# Patient Record
Sex: MALE | Race: WHITE
[De-identification: names, ages, dates, MRNs, and addresses within clinical notes are randomized per-mention and may not be internally consistent; named-entity substitution may affect disease eponyms.]

---

## 2018-08-21 NOTE — HP
HISTORY AND PHYSICAL:

 

DATE OF SURGERY:  08/23/18

 

SURGEON:  Klarissa Barrera MD * (DICTATED BY SUZETTE FLOYD)

 

PROCEDURE:  Right total hip arthroplasty.

 

CHIEF COMPLAINT:  Right hip pain.

 

HISTORY OF PRESENT ILLNESS:  Mr. Mack is a 62-year-old gentleman with 
continued complaints of right hip pain.  He has failed conservative treatment 
and elected to proceed with a right total hip arthroplasty, which is scheduled 
for 

08/23/18 with Dr. Barrera.

 

PAST MEDICAL HISTORY:  Hypertension, history of a DVT, and sleep apnea.

 

PAST SURGICAL HISTORY:  Appendectomy, left total hip arthroplasty.

 

CURRENT MEDICATIONS:

1.  Furosemide 40 mg one and half tab daily.

2.  Aleve.

3.  Lipo-flavonoid.

4.  Benicar 40/25 mg daily.

5.  Centrum Silver.

 

ALLERGIES:  None.

 

FAMILY HISTORY:  Lung cancer, heart disease, and kidney disease.

 

SOCIAL HISTORY:  He is a 62-year-old gentleman.  He lives with his wife.  He 
does not smoke or use drugs.  Uses occasional alcohol.

 

REVIEW OF SYSTEMS:  A complete 14-point review of systems was reviewed with the 
patient.  It was positive for history of a DVT approximately 5 to 6 years ago.  
He denies a history of hepatitis, HIV, MRSA, or anesthesia problems.

 

                               PHYSICAL EXAMINATION

 

GENERAL:  He is well developed, well nourished, in no acute distress.

 

VITAL SIGNS:  He stands 71 inches tall, weighs 318 pounds.  His blood pressure 
is 130/80, his heart rate is 82.

 

HEENT:  Normocephalic, atraumatic.

 

NECK:  Supple.  No palpable lymph nodes.

 

PULMONARY:  The lungs are clear to auscultation bilaterally.

 

CARDIO:  Regular rate and rhythm.  Strong S1, S2.

 

ABDOMEN:  Soft, nontender, nondistended.

 

MUSCULOSKELETAL:  Right lower extremity, the skin is intact.  There are no open 
wounds or abrasions.  He walks with an antalgic type gait favoring his right 
hip. He has decreased internal and external rotation of the right hip.  He has 
a 2+ dorsalis pedis pulse, 1+ pitting edema.  The lower extremity muscle group 
strengths are intact at 5/5 and he has intact sensation.  The calves are soft 
and nontender.

 

NEUROLOGICAL:  He is alert, oriented x3.  Cranial nerves II through XII are 
intact.

 

 ASSESSMENT AND PLAN:  Mr. Mack is a 62-year-old gentleman with end-stage 
osteoarthritis of the right hip.  He has failed conservative treatment and 
elected to proceed with a right total hip arthroplasty, which is scheduled for 
08/23/18 with Dr. Barrera.  Dr. Barrera discussed the risks and benefits of the 
surgery at today's visit and all of his questions were answered.  He will 
follow up with Dr. Barrera 2 weeks after the surgery.

 

 ____________________________________ SUZETTE FLOYD

 

[id number]/[facility] #: [id number]

MTDD

## 2018-08-23 ENCOUNTER — HOSPITAL ENCOUNTER (INPATIENT)
Dept: HOSPITAL 25 - AA | Age: 63
LOS: 2 days | Discharge: HOME | DRG: 301 | End: 2018-08-25
Attending: HOSPITALIST | Admitting: ORTHOPAEDIC SURGERY
Payer: COMMERCIAL

## 2018-08-23 DIAGNOSIS — Z79.1: ICD-10-CM

## 2018-08-23 DIAGNOSIS — I10: ICD-10-CM

## 2018-08-23 DIAGNOSIS — E66.01: ICD-10-CM

## 2018-08-23 DIAGNOSIS — Z96.642: ICD-10-CM

## 2018-08-23 DIAGNOSIS — Z82.49: ICD-10-CM

## 2018-08-23 DIAGNOSIS — G47.33: ICD-10-CM

## 2018-08-23 DIAGNOSIS — Z84.1: ICD-10-CM

## 2018-08-23 DIAGNOSIS — Z80.1: ICD-10-CM

## 2018-08-23 DIAGNOSIS — Z86.718: ICD-10-CM

## 2018-08-23 DIAGNOSIS — M16.11: Primary | ICD-10-CM

## 2018-08-23 DIAGNOSIS — Z79.899: ICD-10-CM

## 2018-08-23 PROCEDURE — 36415 COLL VENOUS BLD VENIPUNCTURE: CPT

## 2018-08-23 PROCEDURE — 85610 PROTHROMBIN TIME: CPT

## 2018-08-23 PROCEDURE — 85018 HEMOGLOBIN: CPT

## 2018-08-23 PROCEDURE — 85049 AUTOMATED PLATELET COUNT: CPT

## 2018-08-23 PROCEDURE — 94660 CPAP INITIATION&MGMT: CPT

## 2018-08-23 PROCEDURE — 0SR904A REPLACEMENT OF RIGHT HIP JOINT WITH CERAMIC ON POLYETHYLENE SYNTHETIC SUBSTITUTE, UNCEMENTED, OPEN APPROACH: ICD-10-PCS | Performed by: ORTHOPAEDIC SURGERY

## 2018-08-23 PROCEDURE — 80048 BASIC METABOLIC PNL TOTAL CA: CPT

## 2018-08-23 PROCEDURE — 85014 HEMATOCRIT: CPT

## 2018-08-23 RX ADMIN — ACETAMINOPHEN SCH MG: 325 TABLET ORAL at 15:30

## 2018-08-23 RX ADMIN — FENTANYL CITRATE PRN MCG: 0.05 INJECTION, SOLUTION INTRAMUSCULAR; INTRAVENOUS at 11:04

## 2018-08-23 RX ADMIN — OXYCODONE HYDROCHLORIDE AND ACETAMINOPHEN PRN TAB: 5; 325 TABLET ORAL at 11:41

## 2018-08-23 RX ADMIN — FENTANYL CITRATE PRN MCG: 0.05 INJECTION, SOLUTION INTRAMUSCULAR; INTRAVENOUS at 11:42

## 2018-08-23 RX ADMIN — ACETAMINOPHEN SCH MG: 325 TABLET ORAL at 22:57

## 2018-08-23 RX ADMIN — DOCUSATE SODIUM SCH MG: 100 CAPSULE, LIQUID FILLED ORAL at 22:57

## 2018-08-23 RX ADMIN — OXYCODONE HYDROCHLORIDE AND ACETAMINOPHEN PRN TAB: 5; 325 TABLET ORAL at 11:40

## 2018-08-23 RX ADMIN — CYCLOBENZAPRINE HYDROCHLORIDE PRN MG: 10 TABLET, FILM COATED ORAL at 15:29

## 2018-08-23 RX ADMIN — MAGNESIUM HYDROXIDE SCH ML: 400 SUSPENSION ORAL at 22:58

## 2018-08-23 RX ADMIN — OXYCODONE HYDROCHLORIDE AND ACETAMINOPHEN PRN TAB: 5; 325 TABLET ORAL at 16:34

## 2018-08-23 RX ADMIN — CEFAZOLIN SCH MLS/HR: 1 INJECTION, POWDER, FOR SOLUTION INTRAVENOUS at 16:34

## 2018-08-23 RX ADMIN — OXYCODONE HYDROCHLORIDE PRN MG: 5 CAPSULE ORAL at 14:00

## 2018-08-23 NOTE — RAD
HISTORY: RIGHT TOTAL HIP REPLACEMENT



COMPARISONS: None



VIEWS: 1 , single frontal portable intraoperative view of the right hip arthroplasty 



FINDINGS:



Single portable intraoperative view of the right hip during arthroplasty demonstrates a

right hip arthroplasty with a temporary femoral sizing component



IMPRESSION: 

LIMITED PORTABLE INTRAOPERATIVE VIEW OF THE RIGHT HIP DURING ARTHROPLASTY

## 2018-08-23 NOTE — PN
Progress Note





- Progress Note


Date of Service: 08/23/18


Note: 





resting comfortably, pain well controlled; 2+ DP pulse, intact sensation, able 

to dorsi flex/plantar flex; dressing c/d/i

## 2018-08-23 NOTE — CONS
CC:  Dr. Barrera; Liana Dutton DO *

 

CONSULTATION REPORT:

 

DATE OF CONSULT:  18

 

PATIENT OF:  Dr. Klarissa Barrera.

 

REFERRED TO:  Aisha Almanza DO

 

PRIMARY CARE PHYSICIAN:  Liana Dutton DO

 

REASON FOR CONSULT:  Medical co-management.

 

HISTORY OF PRESENT ILLNESS:  Mr. Mack is a 62-year-old gentleman with past 
medical history significant for hypertension, osteoarthritis, obstructive sleep 
apnea and morbid obesity who has been suffering from consistent right hip pain 
for the past few years.  The patient had prior left hip replacement done back 
in  and for the past few months, he had noticed increasing pain to his 
right hip.  He was evaluated by the [facility] and found 
to be a good candidate for right hip replacement to be performed on an elective 
basis.  The patient had history of hypertension that has been well controlled 
using his medications at home.  He has a significant family history of coronary 
artery disease; however, he denies any history of hyperlipidemia or history of 
MI himself. He also has history of obstructive sleep apnea, which he has not 
been using any CPAP at home.  According to his wife, she wakes him up numerous 
times during the night when she notices that he stopped breathing and has been 
snoring loudly.  He was taken to the operating room earlier today and had a 
successful right total hip replacement and he was lying comfortably in recovery 
room.  We were asked to see the patient in consultation regarding his medical 
comorbidities and to provide medical co-management while his stay in the 
hospital.

 

PAST MEDICAL HISTORY:  As mentioned above significant for:

1.  Hypertension.

2.  Osteoarthritis.

3.  Obstructive sleep apnea.

4.  Morbid obesity.

 

PAST SURGICAL HISTORY:

1.  Significant for appendectomy.

2.  Prior total hip replacement on the left side back in .

3.  Postop day #0, status post right total hip replacement.

 

CURRENT MEDICATIONS:  His medications at home include:

1.  Acetaminophen 650 mg p.o. q.4 hours as needed for pain or fever.

2.  Lasix 40 mg p.o. daily.

3.  Benicar 40/25 mg 1 tablet p.o. q.a.m.

4.  Multiple vitamins 1 tablet daily.

5.  Aleve 220 mg 2 tablets by mouth q.6 hours as needed for arthritic aches and 
pains.

 

ALLERGIES:  He has no known drug allergies.

 

FAMILY HISTORY:  Reviewed and significant for coronary artery disease and his 
mother, who  in her 70s as well as history of lung cancer in his father in 
his early 40s.

 

SOCIAL HISTORY:  The patient is retired.  He is nonsmoker who drinks alcohol 
rarely.  His wife, Donna, is the healthcare proxy carrier and he wishes to be a 
full code.

 

REVIEW OF SYSTEMS:  See HPI, otherwise 14 points review of systems were 
examined and they were essentially negative.

 

PHYSICAL EXAM:  General:  He is a pleasant, obese, upper middle age gentleman, 
lying comfortably on his bed and in no acute distress or discomfort at the time 
of consultation.  Vitals revealed a blood pressure of 159/83, pulse of 87, 
temperature of 96.8, respirations of 14 with O2 sat of 98% on 5 L oxygen via 
mask.  HEENT: Head is normocephalic, atraumatic.  Sclerae anicteric.  PERRLA.  
EOMs intact. Oropharynx is pink and moist.  Neck:  Supple.  Trachea midline.  
No cervical adenopathy, thyromegaly, or JVD.  Lungs:  Clear to auscultation 
bilaterally. Heart:  Regular rate and rhythm.  Normal S1 and S2 without rubs, 
murmurs, or gallops.  Back with normal curvature.  No CVA tenderness.  Abdomen:
  Soft, obese, and round.  There is no distention or tenderness noted.  No 
guarding, rigidity, or rebound tenderness.  No hernias, masses, or 
hepatosplenomegaly.  Extremities: Without cyanosis, clubbing, or edema.  
Neurologic:  He is awake, alert, and oriented x4.  Tongue is midline.  Muscle 
 is equal bilaterally and sensation is intact throughout.  Rectal exam 
deferred at this time.

 

IMPRESSION:  A 62-year-old gentleman with past medical history of hypertension, 
morbid obesity, osteoarthritis, and obstructive sleep apnea, who is postop day #
0 status post right total hip replacement. ASSESSMENT AND PLAN:

1.  Hypertension.  The patient appears to be slightly hypertensive in the 
immediate postoperative period, likely due to added stress from surgery and 
occasional pain and discomfort.  I will maintain him on his Benicar to be taken 
once daily in the morning; however, we will hold his Lasix for the time being 
since he has been getting IV fluid in a postoperative period.  We will keep 
close monitoring of his blood pressure and potentially may add other agents to 
keep it within normal limits.

2.  Status post right total hip replacement, management per orthopedic team.  I 
anticipate he is going to start his physical therapy in the morning.  He is 
currently having a Carrillo catheter that will be likely discontinued in the 
morning as well.  Pain control appears to be adequate at this time and 
management will be per ortho team.  Anticoagulation protocol will be also 
managed by Orthopedics.

3.  Obstructive sleep apnea.  The patient had an episode of desaturation of his 
oxygen level at PACU.  He refuses to use CPAP at home.  I had long conversation 
with him regarding the necessity to keep monitor his oxygen saturation in the 
next 24 hours and he will use his CPAP while in the hospital and hopefully to 
continue using at home.

4.  Osteoarthritis.  We will continue his pain medication per orthopedic team.

5.  Morbid obesity.  Supportive care and physical therapy.

6.  DVT prophylaxis.  The patient is being covered with Lovenox to be bridge to 
Coumadin and management is done by orthopedic team.

7.  Code status.  He is a full code.

 

TIME SPENT:  I spent approximately 50 minutes consult into this patient, for 
which greater than 50% of the time spent on taking history and performing 
physical exam. I went on and discussed the case with my attending, Dr. Aisha Almanza, who agreed to plan of care.

 

Thank you for this consultation.

 

____________________________________ SUZETTE SHAHID

 

[id number]/[facility] #: [id number]

MTDD

## 2018-08-23 NOTE — XMS REPORT
Daryl Mack

 Created on:2018



Patient:Daryl Mack

Sex:Male

:1955

External Reference #:[id number]





Demographics







 Address  [address] [place] [address]

 

 Home Phone  [phone]

 

 Mobile Phone  [phone]

 

 Email Address  [email or web address]

 

 Preferred Language  English

 

 Marital Status  Declined to Specify/Unknown

 

 Buddhism Affiliation  Unknown

 

 Race  White

 

 Ethnic Group  Declined to Specify/Unknown









Author







 Organization  [employer]

 

 Address  [address]



   [place] [address]

 

 Phone  [phone]









Support







 Name  Relationship  Address  Phone

 

 Donna Mack  Wife  [address]  [phone]



     [place] [address]  









Care Team Providers







 Name  Role  Phone

 

 Liana Newman DO  Primary Care Physician  Unavailable









Payers







 Type  Date  Identification Numbers  Payment Provider  Subscriber

 

 Commercial    Policy Number: [id number]  [facility]  Daryl Mack









 PayID: [id number]  [address]









 [place] [address]







Problems







 Date  Description  Provider  Status

 

 Onset: 2018  Localized, primary osteoarthritis of the  Klarissaann marie Barrera M.D.  Active



   pelvic region and thigh    







Family History







 Date  Family Member(s)  Problem(s)  Comments

 

   General  Cancer  

 

   General  Lung Cancer  

 

   Father  Lung Cancer  

 

   Mother  Heart Disease  

 

   First Brother  Lung Cancer  

 

   First Brother  Brain Cancer  







Social History







 Type  Date  Description  Comments

 

 Marital Status      

 

 Lives With    Spouse  

 

 Occupation    Currently Working   at [employer]



       store.

 

 ETOH Use    Occasionally consumes alcohol  

 

 Smoking    Patient has never smoked  

 

 Recreational Drug Use    Denies Drug Use  

 

 Exercise Type/Frequency    Exercises sporadically  







Allergies, Adverse Reactions, Alerts







 Date  Description  Reaction  Status  Severity  Comments

 

 2018  NKDA    active    







Medications







 Medication  Date  Status  Form  Strength  Qnty  SIG  Indications  Ordering



                 Provider

 

 Raised  /  Active  Misc    1units  use after  M16.11  Klarissa



 Toilet Seat  2018          right total    Bruce,



             hip    M.DWil



             arthroplasty    

 

 Rolling  /  Active      1units  use for  M16.11  Klarissa



 Walker  2018          ambulation s/p    kamaljit BarreraDWil

 

 Lasix  /  Active  Tablets  40mg    1.5 po qday    Unknown



   0000              

 

 Centrum  00/00/  Active  Tablets      1 by mouth    Unknown



 Silver  0000          every day    

 

 Olmesartan  /  Active  Tablets  40-25mg    1 by mouth    Unknown



 Medoxomil/Hy  0000          every day    



 drochlorothi                



 azide                

 

 Equate Plus  /  Active  Liquid      as needed    Unknown



   0000              

 

 Tylenol  00/  Active            Unknown



   0000              

 

                 

 

 Aleve  /  Hx  Capsules  220mg    1-2 by mouth    Unknown



   0000 -          twice a day as    



   2018              

 

 Benicar HCT  /  Hx  Tablets  40-25mg    1 by mouth    Unknown



   0000 -          every day    



   2018              

 

 Lipo-Flavono  /  Hx  Tablets      1 po tid    Unknown



 id Plus  0000 -              



   2018              

 

 Ocuflox  /  Hx  Solution  0.3%    2 drops each    Unknown



   0000 -          eye every two    



   07/10/          hours while    



   2018          awake x5 days.    

 

 Aleve  /  Hx            Unknown



   0000 -              



   2018              







Vital Signs







 Date  Vital  Result  Comment

 

 2018  Height  71 inches  5'11"









 Weight  318.75 lb  

 

 BP Systolic Sitting  130 mmHg  

 

 BP Diastolic Sitting  86 mmHg  

 

 Respiratory Rate  20 /min  

 

 Body Temperature  98.1 F  

 

 Pain Level  7  

 

 BMI (Body Mass Index)  44.5 kg/m2  









 2018  Height  69.5 inches  5'9.50"









 Weight  319.00 lb  

 

 Heart Rate  80 /min  

 

 BP Systolic  126 mmHg  

 

 BP Diastolic  82 mmHg  

 

 BMI (Body Mass Index)  46.4 kg/m2  









 2018  Height  73 inches  6'1"









 Heart Rate  83 /min  

 

 BP Systolic Sitting  136 mmHg  

 

 BP Diastolic Sitting  70 mmHg  

 

 Respiratory Rate  17 /min  

 

 Pain Level  5  can get very severe

 

 O2 % BldC Oximetry  96 %  ra







Results







 Description

 

 No Information







Procedures







 Date  CPT Code  Description  Status

 

 2018  38952  Radiologic Exam Hips Bilateral With Pelvis Minimum 5  
Completed



     Views  







Encounters







 Type  Date  Location  Provider  CPT E/M  Dx

 

 Office Visit  2018  [facility]  Klarissa Barrera M.D.  16912  
M16.11



   10:45a  [facility]      









 M25.551









 Office Visit  2018 11:00a  [facility]  Bruce Martin MD  
26675  M16.11



     [facility] [place]      









 Z96.642







Plan of Care

Future Appointment(s):2018  9:30 am - Klarissa Barrera M.D. at [facility]  9:30 am - Blue Nickerson PA-C at [facility]2018  9:30 am - SUZETTE Lubin at [facility]2018  9:30 am - Kalrissa Barrera M.D. at [facility]2018 - Klarissa Barrera M.D.M16.11 Unilateral primary 
osteoarthritis, right hipNew Medication:Raised Toilet SeatRolling WalkerFollow 
up:Follow up:   2 weeks after [id number] Pain in right hip

## 2018-08-24 LAB
HCT VFR BLD AUTO: 33 % (ref 42–52)
HGB BLD-MCNC: 11.5 G/DL (ref 14–18)
INR PPP/BLD: 1.12 (ref 0.77–1.02)
PLATELET # BLD AUTO: 155 10^3/UL (ref 150–450)

## 2018-08-24 RX ADMIN — OXYCODONE HYDROCHLORIDE PRN MG: 5 CAPSULE ORAL at 14:52

## 2018-08-24 RX ADMIN — MAGNESIUM HYDROXIDE SCH ML: 400 SUSPENSION ORAL at 20:58

## 2018-08-24 RX ADMIN — HYDROCHLOROTHIAZIDE SCH MG: 25 TABLET ORAL at 07:55

## 2018-08-24 RX ADMIN — ACETAMINOPHEN SCH MG: 325 TABLET ORAL at 14:52

## 2018-08-24 RX ADMIN — VALSARTAN SCH MG: 160 TABLET ORAL at 07:55

## 2018-08-24 RX ADMIN — OXYCODONE HYDROCHLORIDE PRN MG: 5 CAPSULE ORAL at 22:07

## 2018-08-24 RX ADMIN — OXYCODONE HYDROCHLORIDE PRN MG: 5 CAPSULE ORAL at 05:48

## 2018-08-24 RX ADMIN — CYCLOBENZAPRINE HYDROCHLORIDE PRN MG: 10 TABLET, FILM COATED ORAL at 01:40

## 2018-08-24 RX ADMIN — FUROSEMIDE SCH MG: 20 TABLET ORAL at 11:48

## 2018-08-24 RX ADMIN — THERA TABS SCH TAB: TAB at 07:38

## 2018-08-24 RX ADMIN — CEFAZOLIN SCH MLS/HR: 1 INJECTION, POWDER, FOR SOLUTION INTRAVENOUS at 07:38

## 2018-08-24 RX ADMIN — CEFAZOLIN SCH MLS/HR: 1 INJECTION, POWDER, FOR SOLUTION INTRAVENOUS at 00:12

## 2018-08-24 RX ADMIN — OXYCODONE HYDROCHLORIDE AND ACETAMINOPHEN PRN TAB: 5; 325 TABLET ORAL at 07:58

## 2018-08-24 RX ADMIN — DOCUSATE SODIUM SCH MG: 100 CAPSULE, LIQUID FILLED ORAL at 07:38

## 2018-08-24 RX ADMIN — MAGNESIUM HYDROXIDE SCH ML: 400 SUSPENSION ORAL at 07:39

## 2018-08-24 RX ADMIN — DOCUSATE SODIUM SCH MG: 100 CAPSULE, LIQUID FILLED ORAL at 20:58

## 2018-08-24 RX ADMIN — OXYCODONE HYDROCHLORIDE PRN MG: 5 CAPSULE ORAL at 01:41

## 2018-08-24 RX ADMIN — ACETAMINOPHEN SCH MG: 325 TABLET ORAL at 22:07

## 2018-08-24 RX ADMIN — OXYCODONE HYDROCHLORIDE AND ACETAMINOPHEN PRN TAB: 5; 325 TABLET ORAL at 11:51

## 2018-08-24 RX ADMIN — ACETAMINOPHEN SCH MG: 325 TABLET ORAL at 05:49

## 2018-08-24 NOTE — OP
DATE OF OPERATION:  08/23/18 - ROOM #341

 

DATE OF BIRTH:  [birth year]

 

SURGEON:  Klarissa Barrera MD

 

ASSISTANT:  SUZETTE Denis.  Mr. Nickerson did help throughout the procedure 
with preparation of the leg, wound retraction, manipulation of the hip and 
wound closure.

 

ANESTHESIOLOGIST:  Dr. Carrasco.

 

ANESTHESIA:  General.

 

PRE-OP DIAGNOSES:

1.  Severe end-stage degenerative osteoarthritis of the right hip joint.

2.  Morbid obesity.

 

POST-OP DIAGNOSES:

1.  Severe end-stage degenerative osteoarthritis of the right hip joint.

2.  Morbid obesity.

 

OPERATIVE PROCEDURE:  Right total hip arthroplasty plus modifier for increased 
complexity of case secondary to morbid obesity add in at least 1 hour to the 
operative time.

 

BRIEF HISTORY/INDICATIONS:  Mr. Mack is a 62-year-old gentleman with years 
of increasingly severe right hip pain.  He failed conservative treatment with 
anti- inflammatories, pain medication, intraarticular injections, and physical 
therapy. Due to continued pain and decreased quality of life, the patient 
elected to undergo a right total hip arthroplasty.  Radiograph showed bone-on-
bone arthritis. Informed consent was obtained from the patient.  He understood 
the risks of surgery included, but were not limited to bleeding, infection, 
damage to nearby structures, continued pain, need for further surgery, 
intraoperative fracture, nerve palsy, hardware failure or loosening, dislocation
, leg length discrepancy, stroke, heart attack, blood clot, and death.  He 
wished to proceed.

 

INTRAOPERATIVE FINDINGS:  Intraoperatively, the patient was noted to have 
morbid obesity, which made each step of the case more difficult.  His morbid 
obesity added at least 1 hour to the operative case.  He had at least 8 cm of 
subcutaneous fat. He was noted to have extensive osteoarthritis with full 
thickness loss of cartilage along the acetabulum more than femoral head and 
neck.

 

COMPLICATIONS:  None.

 

SPECIMEN:  Femoral head and acetabular reaming sent to Pathology.

 

ESTIMATED BLOOD LOSS:  400 cc.

 

HARDWARE USED:  This is uncemented Jackie total hip arthroplasty hardware.  
For the cup, a Tritanium cluster hole shell 56D single 20 mm screw.  For the 
liner, a Trident X3 10-degree polyethylene insert, 36 E.  For the stem, an 
Accolade TMZF size 3.5 with a 127-degree neck and for the head, a Biolox delta 
ceramic V40 femoral head 36 -2.5.

 

DESCRIPTION OF PROCEDURE:  Mr. Frederick was identified in the preanesthesia 
unit. His right lower extremity was marked as the correct operative side.  
Informed consent was signed and placed in the chart.  The patient was taken to 
the operating room and placed under general anesthesia.  A Carrillo catheter was 
placed.  The patient was placed in the left lateral decubitus position on the 
pegboard.  All bony prominences were well padded.  Right lower extremity was 
prepped and draped in the usual sterile fashion.  Preop time-out was made to 
correctly identify the patient's side and site.  Appropriate perioperative 
antibiotics were given within 1 hour of incision.

 

A standard posterior hip incision was made with the 10 blade and carried down 
to the lateral fascial layer.  New 10 blade was used to make a lateral fascial 
layer incision in line with the skin incision.  A Charnley retractor was 
placed.  The posterior hip joint was visualized.  The piriformis and conjoint 
tendons were elevated off the posterolateral femur using electrocautery and 
tagged with #5 Ethibond.  Electrocautery was then used to make a standard 
posterolateral capsular flap and tagged with #5 Ethibond. The hip was carefully 
dislocated. Lesser troch to center of the femoral head measured 62 mm.  The 
oscillating saw was used to make the appropriate femoral neck cut.  The femoral 
head was removed.  The femur was retracted anteriorly.  After appropriate 
placement of retractors, the acetabulum was visualized.

 

A long-handled knife was used to sharply remove any remaining labrum from the 
acetabular rim.  The acetabulum was sequentially reamed up to a size 55.  A 55 
reamer obtained bleeding subchondral bone bed.  A 55 trial had good fit.  Final 
implant chosen was a Tritanium cluster hole shell 56 D.  This was impacted into 
the acetabulum without difficulty.  The cup was stable with appropriate 
anteversion and abduction angle.  A 20 mm screw was placed in the supero-
posterior quadrant for added stability.  A Trident X3 10 degree polyethylene 
insert 36 E was chosen.  This was impacted into the acetabulum.  Stability of 
the liner was checked and rechecked and noted to be stable.

 

Next, attention was turned to preparation of the proximal femur.  The patient's 
body habitus made retraction quite difficult.  A canal finder was used to enter 
the proximal femur.  The proximal femur was sequentially broached up to a size 
3.5.  A 3.5 broach had excellent fit and appropriate anteversion.  A 127 neck 
trial was chosen as well as a 36+0 head trial.  Lesser troch to center of the 
femoral head measured 66 mm; therefore, a -3.5 head was chosen.  This measured 
63 mm.  The hip was reduced and taken through a range of motion. The hip was 
stable in all positions.  There was good soft tissue tension and appropriate 
leg length.

 

The hip was carefully dislocated.  All trials were removed.  Final implant 
chosen was an Accolade TMZF size 3.5 with a 127-degree neck.  Head chosen was a 
36 -2.5 Biolox delta ceramic V40 femoral head.  This was impacted onto the 
femoral neck. The hip was reduced and taken through a range of motion.  The hip 
was stable in all positions.  The incision was copiously irrigated with sterile 
saline.  Previously tagged capsule and tendons were reapproximated to the 
posterolateral femur through 2 trochanteric drill holes.  The lateral fascial 
layer was closed using interrupted #1 Vicryls.  The rest of the incision was 
closed in a layered fashion using 0 and 2- 0 Vicryls.  Skin was closed using 
running 3-0 Monocryl with Dermabond.  Sterile Adaptic, 4x4s, and paper tape 
were used to cover the incision.  The patient's anesthesia was reversed without 
difficulty.  He was taken to the PACU in stable condition.  Intended 
weightbearing will be weightbearing as tolerated.  Intended DVT prophylaxis 
will be Coumadin with a Lovenox bridge.

 

 [id number]/[facility] #: [id number]

JAMIE

## 2018-08-24 NOTE — PN
Progress Note





- Progress Note


Date of Service: 08/24/18


SOAP: 


Subjective:


[Pt was seen today sitting up in chair. He states that he has mild pain and 

that it is manageable. He states he feels as he may be ready to go home today 

but is leaning more towards going home tomorrow morning as he would like his 

wife to not drive in the evening. He denies any SOB, Chest pain, nausea or 

vomiting. ]








Objective:


[General: A&Ox3, NAD. 


MSK, RLE: Dressing is c/d/i. +df/pf. Calves are soft and non tender to 

palpation. Sensation is intact to light touch and the pt has a 2+ DP and PT 

pulse.]


 Vital Signs











Temp  97.2 F   08/24/18 09:23


 


Pulse  98   08/24/18 09:23


 


Resp  15   08/24/18 09:53


 


BP  150/65   08/24/18 09:23


 


Pulse Ox  97   08/24/18 09:23








 Intake & Output











 08/23/18 08/24/18 08/24/18





 18:59 06:59 18:59


 


Intake Total 2750 360 650


 


Output Total 1200 720 0


 


Balance 1550 -360 650


 


Intake:   


 


  IV Fluids 2600  


 


    CEFAZOLIN 3G 100  


 


    lr 2500  


 


  Oral 150 360 650


 


Output:   


 


  Urine   0


 


  Carrillo 600 720 


 


  Emesis 300  


 


  Estimated Blood Loss 300  


 


Other:   


 


  # Bowel Movements  0 














Assessment:


[POD 1: RTHA]








Plan:


[PT/OT


continue current pain medication 


8mg of Coumadin tonight 


Possible DC tonight, more likely tomorrow morning.]

## 2018-08-24 NOTE — PN
Subjective


Date of Service: 08/24/18


Interval History: 





Mr. Mack reports feeling well this afternoon and denies chest pain, SOB, 

nausea, or abdominal pain.  He reports that his knee pain is well controlled on 

the current pain medication regimen.











Objective


Active Medications: 





Acetaminophen (Tylenol Tab*)  650 mg PO Q8HR KARINA


Bisacodyl (Dulcolax Supp*)  10 mg AK DAILY PRN


Cyclobenzaprine HCl (Flexeril Tab*)  10 mg PO TID PRN


Diphenhydramine HCl (Benadryl Iv*)  25 mg IV Q6H PRN


Docusate Sodium (Colace Cap*)  100 mg PO BID KARINA


Enoxaparin Sodium (Lovenox(*))  40 mg SUBCUT Q24H KARINA


Hydrochlorothiazide (Hydrodiuril Tab*)  25 mg PO DAILY KARINA


Lactated Ringer's (Lactated Ringers 1000 Ml Bag*)  1,000 mls @ 125 mls/hr IV 

PER RATE KARINA


Lactated Ringer's (Lactated Ringers 1000 Ml Bag*)  1,000 mls @ 100 mls/hr IV 

PER RATE KARINA


Magnesium Hydroxide (Milk Of Magnesia Liq*)  30 ml PO BID KARINA


Magnesium Hydroxide (Milk Of Magnesia Liq*)  30 ml PO Q6H PRN


Morphine Sulfate (Morphine Inj ((Syringe))*)  4 mg IV Q2H PRN


Multivitamins (Theragran Tab*)  1 tab PO DAILY KARINA


Ondansetron HCl (Zofran Inj*)  4 mg IV Q6H PRN


Oxycodone HCl (Roxycodone Tab*)  10 mg PO Q4H PRN


Oxycodone/Acetaminophen (Percocet 5/325 Tab*)  1 tab PO Q4H PRN


Oxycodone/Acetaminophen (Percocet 5/325 Tab*)  2 tab PO Q4H PRN


Pharmacy Profile Note (Coumadin Daily Reminder*)  0 note FOLLOW UP 1700 KARINA


Valsartan (Diovan Tab*)  320 mg PO DAILY [facility]





Vital Signs:











Temp Pulse Resp BP Pulse Ox


 


 99.4 F   93   16   130/68   95 


 


 08/24/18 03:55  08/24/18 03:55  08/24/18 07:58  08/24/18 03:55  08/24/18 03:55











Oxygen Devices in Use Now: None


Appearance: Male sitting up in chair in NAD


Eyes: No Scleral Icterus


Ears/Nose/Mouth/Throat: Mucous Membranes Moist


Neck: Trachea Midline


Respiratory: Symmetrical Chest Expansion and Respiratory Effort, Clear to 

Auscultation


Cardiovascular: NL Sounds; No Murmurs; No JVD, No Edema


Abdominal: NL Sounds; No Tenderness; No Distention


Extremities: No Edema


Skin: No Rash or Ulcers


Neurological: Alert and Oriented x 3, NL Muscle Strength and Tone


Nutrition: Taking PO's


Result Diagrams: 


 08/24/18 06:13





 08/24/18 08:05





Assess/Plan/Problems-Billing


Assessment: 





Mr. Mack is a 63 yo M with a PMH of HTN and ANNELIESE who cannot tolerate home 

CPAP who was admitted on 8/23/18 for an elective right hip total arthroplasty.








- Patient Problems


(1) S/P total hip arthroplasty


Comment: 


- POD # 1, management per ortho.


- Pain meds prn with bowel regimen.


- PT/OT.


- Hgb 11.5.   





(2) Hypertension


Comment: 


- SBP 150s.


- Continue lasix, ARB and htcz.   





(3) ANNELIESE (obstructive sleep apnea)


Comment: 


- Not on home cpap.   





(4) DVT prophylaxis


Comment: 


- Lovenox with warfarin.   





(5) Full code status


Comment:

## 2018-08-25 VITALS — SYSTOLIC BLOOD PRESSURE: 126 MMHG | DIASTOLIC BLOOD PRESSURE: 55 MMHG

## 2018-08-25 LAB
HCT VFR BLD AUTO: 28 % (ref 42–52)
HGB BLD-MCNC: 9.7 G/DL (ref 14–18)
INR PPP/BLD: 1.37 (ref 0.77–1.02)
PLATELET # BLD AUTO: 153 10^3/UL (ref 150–450)

## 2018-08-25 RX ADMIN — VALSARTAN SCH MG: 160 TABLET ORAL at 09:17

## 2018-08-25 RX ADMIN — OXYCODONE HYDROCHLORIDE AND ACETAMINOPHEN PRN TAB: 5; 325 TABLET ORAL at 03:41

## 2018-08-25 RX ADMIN — FUROSEMIDE SCH: 20 TABLET ORAL at 09:17

## 2018-08-25 RX ADMIN — THERA TABS SCH TAB: TAB at 09:18

## 2018-08-25 RX ADMIN — OXYCODONE HYDROCHLORIDE AND ACETAMINOPHEN PRN TAB: 5; 325 TABLET ORAL at 09:50

## 2018-08-25 RX ADMIN — MAGNESIUM HYDROXIDE SCH ML: 400 SUSPENSION ORAL at 09:17

## 2018-08-25 RX ADMIN — DOCUSATE SODIUM SCH MG: 100 CAPSULE, LIQUID FILLED ORAL at 09:17

## 2018-08-25 RX ADMIN — HYDROCHLOROTHIAZIDE SCH MG: 25 TABLET ORAL at 09:17

## 2018-08-25 RX ADMIN — ACETAMINOPHEN SCH MG: 325 TABLET ORAL at 06:02

## 2018-08-25 NOTE — PN
Subjective


Date of Service: 08/25/18


Interval History: 





Mr. Mack denies complaint today.  He has been up working with PT already and 

is hopeful to go home later this afternoon.








Objective


Active Medications: 





Acetaminophen (Tylenol Tab*)  650 mg PO Q8HR KARINA


Bisacodyl (Dulcolax Supp*)  10 mg SC DAILY PRN


Cyclobenzaprine HCl (Flexeril Tab*)  10 mg PO TID PRN


Diphenhydramine HCl (Benadryl Iv*)  25 mg IV Q6H PRN


Docusate Sodium (Colace Cap*)  100 mg PO BID KARINA


Enoxaparin Sodium (Lovenox(*))  40 mg SUBCUT Q24H KARINA


Furosemide (Lasix Tab*)  60 mg PO DAILY KARINA


Hydrochlorothiazide (Hydrodiuril Tab*)  25 mg PO DAILY KARINA


Magnesium Hydroxide (Milk Of Magnesia Liq*)  30 ml PO BID KARINA


Magnesium Hydroxide (Milk Of Magnesia Liq*)  30 ml PO Q6H PRN


Morphine Sulfate (Morphine Inj ((Syringe))*)  4 mg IV Q2H PRN


Multivitamins (Theragran Tab*)  1 tab PO DAILY [facility]


Ondansetron HCl (Zofran Inj*)  4 mg IV Q6H PRN


Oxycodone HCl (Roxycodone Tab*)  10 mg PO Q4H PRN


Oxycodone/Acetaminophen (Percocet 5/325 Tab*)  1 tab PO Q4H PRN


Oxycodone/Acetaminophen (Percocet 5/325 Tab*)  2 tab PO Q4H PRN


Pharmacy Profile Note (Coumadin Daily Reminder*)  0 note FOLLOW UP 1700 [facility]


Valsartan (Diovan Tab*)  320 mg PO DAILY [facility]


Warfarin Sodium (Coumadin Tab(*))  8 mg PO DAILY@1700 [facility]; Protocol





Vital Signs:











Temp Pulse Resp BP Pulse Ox


 


 97.4 F   85   18   123/55   99 


 


 08/25/18 07:16  08/25/18 07:16  08/25/18 09:50  08/25/18 07:16  08/25/18 08:00











Oxygen Devices in Use Now: CPAP


Result Diagrams: 


 08/25/18 05:50





 08/24/18 08:05





Assess/Plan/Problems-Billing


Assessment: 





Mr. Mack is a 61 yo M with a PMH of HTN and ANNELIESE who cannot tolerate home 

CPAP who was admitted on 8/23/18 for an elective right hip total arthroplasty.








- Patient Problems


(1) S/P total hip arthroplasty


Comment: 


- POD # 2, management per ortho.


- Pain meds prn with bowel regimen.


- PT/OT.


- Hgb 9.7.   





(2) Hypertension


Comment: 


- -150s.


- Continue lasix, ARB and htcz.   





(3) ANNELIESE (obstructive sleep apnea)


Comment: 


- Not on home cpap.   





(4) DVT prophylaxis


Comment: 


- Lovenox with warfarin.   





(5) Full code status


Comment: 


   


Status and Disposition: 





Inpatient.  Disposition per ortho, likely home today.

## 2018-08-25 NOTE — PN
Progress Note





- Progress Note


Date of Service: 08/25/18


SOAP: 


Subjective:


[Pt doing well.  R hip pain controlled with po meds.  Feels ready to go home.]








Objective:


[A and O x 3, NAD.   At PT upon my visit.


R hip dressing C/D/I   Calves soft, NT.   Pt ambulating with walker





Vital Signs:











Temp Pulse Resp BP Pulse Ox


 


 97.4 F   85   18   123/55   99 


 


 08/25/18 07:16  08/25/18 07:16  08/25/18 08:00  08/25/18 07:16  08/25/18 08:00











 Laboratory Results - last 24 hr











  08/25/18 08/25/18





  05:50 05:50


 


Hgb  9.7 L 


 


Hct  28 L 


 


Plt Count  153 


 


MPV  7.6 


 


INR (Anticoag Therapy)   1.37 H








]








Assessment:


[s/p R NURIA POD #2]








Plan:


[Percocet for pain


Coumadin for DVT prophylaxis - 8 mg today, 8 mg Sunday


D/C patient home with services


F/U with Dr. Barrera in 2 weeks]

## 2018-08-28 NOTE — DS
DISCHARGE SUMMARY:

 

DATE OF ADMISSION:  08/23/18

 

DATE OF DISCHARGE:  08/25/18

 

ADMITTING PHYSICIAN:  Dr. Barrera.

 

ADMITTING DIAGNOSES:

1.  Right hip osteoarthritis.

2.  Hypertension.

3.  History of deep venous thrombosis.

4.  Sleep apnea.

 

DISCHARGE DIAGNOSES:

1.  Status post right total hip arthroplasty.

2.  Hypertension.

3.  History of deep venous thrombosis.

4.  Sleep apnea.

 

PROCEDURE:  Right total hip arthroplasty.

 

CONSULTANTS:  Physical Therapy, Occupational Therapy, Medicine.

 

BRIEF HISTORY:  Mr. Mack is a 62-year-old male with severe degenerative osteoarthritis of his righ
t hip.  He failed conservative treatment measures and elected to undergo a right total hip arthroplas
ty on 08/23/18 with Dr. Barrera.

 

HOSPITAL COURSE:  Mr. Mack was admitted to [facility] on 08/23/18. He underwent an unco
mplicated right total hip arthroplasty.  Postoperatively, he recovered on the short-stay surgical uni
t.  His Carrillo catheter was removed on postoperative day 1 and he was able to urinate on his own.  On 
postoperative day 2, he was able to have a bowel movement.  He advanced to a regular diet without dif
ficulty.  His pain was well controlled with Percocet.  He was restarted on home medications.  His vit
al signs and labs remained stable.  He was able to bear weight as tolerated on the right lower extrem
ity.  He advanced appropriately with physical therapy and occupational therapy.  His DVT prophylaxis 
was bridged with Lovenox and Coumadin.  By postoperative day #2, he was orthopedically and medically 
stable for discharge home with services.

 

PHYSICAL EXAM:  General:  On examination, the patient is noted to be calm and cooperative, in no acut
e distress.  He is alert and oriented x3.  Vital Signs:  On day of discharge, temperature 97.4 degree
s Fahrenheit, pulse rate 85, respiratory rate 18, O2 sat on room air 99%, and blood pressure 123/55. 
 Extremities: Examination of the right lower extremity demonstrates dressing overlying the right hip,
 which is clean, dry, and intact.  His thigh is minimally swollen and compressible.  Distally neurova
scular function is intact.  Gross strength is intact distally.

 

LABORATORY DATA:  On the day of discharge, hemoglobin 9.7, hematocrit 28.  INR 1.37.

 

RADIOGRAPHS:  Postoperative radiographs of the right hip demonstrates right total hip arthroplasty wi
th satisfactory prosthesis placement and no acute bony abnormalities.

 

DISCHARGE MEDICATIONS:

1.  Furosemide 40 mg 1-1/2 tabs daily.

2.  Lipo-flavanoid.

3.  Benicar 40/25 mg daily.

4.  Centrum Silver.

5.  Percocet 5/325 one to two tabs p.o. q.4 to 6 hours p.r.n. pain.

6.  Coumadin 2 mg tablets take as directed.

7.  Colace 100 mg p.o. t.i.d. p.r.n. constipation.

 

CONDITION ON DISCHARGE:  Stable.

 

DISCHARGE INSTRUCTIONS:  Mr. Mack is a 62-year-old male, postoperative day #2, status post right t
otal hip arthroplasty, which was uncomplicated.  He is orthopedically and medically stable to be disc
harged home with services.  He has stable vital signs and labs.  He has restarted home medications.  
He will take 8 mg of Coumadin on Saturday night and 8 mg of Coumadin on Sunday night.  He will have h
is INR rechecked on Monday.  He will have INR draws on Mondays and Thursdays with visiting nurse serv
ices.  He will remain weightbearing as tolerated on the right lower extremity following posterior hip
 precautions and he will have home physical therapy twice a day.  He will take Percocet for pain cont
rol and Colace up to 3 times a day for constipation.  He will follow up in the office with Dr. Barrera 
in 10 to 14 days for incision check and suture removal.  He was instructed to call Dr. Barrera to go im
mediately to the ER should he develop any new fevers, chills, incision pain, redness, or drainage. He
 was instructed to go immediately to the ER should he develop chest pain or shortness of breath.

 

____________________________________ SUZETTE SELF

 

[id number]/[facility] #: [id number]

## 2024-11-20 NOTE — RAD
Health Maintenance Topic(s) Outreach Outcomes & Actions Taken:    Colorectal Cancer Screening - Outreach Outcomes & Actions Taken  : Colonoscopy Case Request / Referral / Home Test Order Placed           Additional Notes:     Cologuard ordered             INDICATION: Right hip arthroplasty



COMPARISON: August 23, 2018

 

TECHNIQUE: An AP view of the pelvis and AP views of the hip in neutral and abducted

position were obtained 



FINDINGS: 



Bones: There are no acute focal bony findings.



Joint spaces: There is right hip arthroplasty. The prosthesis appears normally seated.



SI joints/symphysis: The SI joints and symphysis are intact.



Other: None



IMPRESSION: RIGHT HIP ARTHROPLASTY.